# Patient Record
(demographics unavailable — no encounter records)

---

## 2018-02-21 NOTE — RAD
PORTABLE CHEST

2/21/18

 

An AP portable film at 0805 is compared with a 9/19/12 study from [facility].

 

The heart is normal in size. The lungs are clear. No lobar infiltrate or effusion was seen. Some mini
mal left basilar haziness is probably a combination of atelectasis and the patient being turned sligh
tly. 

 

IMPRESSION:  

No acute findings. 

 

POS: HOME

## 2019-11-20 NOTE — OP
DATE OF PROCEDURE:  11/20/2019



PROCEDURE PERFORMED:  Colonoscopy.



PREMEDICATION:  Given by Anesthesiology Department.



PREPROCEDURE DIAGNOSES:  

1. History of colon polyps.

2. Family history of colon cancer in second-degree relative.



POSTPROCEDURE DIAGNOSIS:  Normal colon exam.



DESCRIPTION OF PROCEDURE:  Written consents were obtained prior to procedure.  After

adequate sedation, rectal exam performed was normal.  The endoscope was advanced to

the cecum.  The quality of the bowel prep was good.  The cecum, ascending colon,

hepatic flexure, transverse colon, splenic flexure, descending colon, and

rectosigmoid colon all appeared normal.  Retroflexion did not show any abnormality.

The patient tolerated the procedure well. 



ASSESSMENT:  Normal colonoscopy.



RECOMMENDATION:  Repeat colon screening in 10 years as her previous history of polyp

was hyperplastic. 







Job ID:  [id number]

## 2019-11-20 NOTE — OP
DATE OF PROCEDURE:  11/20/2019



PROCEDURE PERFORMED:  Esophagogastroduodenoscopy.



PREMEDICATION:  Given by Anesthesiology Department.



PREPROCEDURE DIAGNOSES:  

1. Gastroesophageal reflux disease.

2. Symptoms not controlled on medication (ranitidine 150 mg b.i.d.).



POSTPROCEDURE DIAGNOSIS:  Normal upper endoscopy.



DESCRIPTION OF PROCEDURE:  Written consents were obtained prior to procedure.  After

adequate sedation, forward-viewing endoscope was advanced down the stomach under

direct vision to the third portion of duodenum.  The duodenum appeared normal.  The

bulb appeared normal.  The pylorus was patent.  The gastric antrum, body, fundus,

and cardia all appeared normal.  Retroflexion did not show any abnormality or hiatal

hernia.  The GE junction with regular Z-line is located at 36 cm from the incisors.

The lower, mid, and upper esophagus appeared normal.  The instrument was then fully

removed.  The patient tolerated the procedure well. 



ASSESSMENT:  Normal upper endoscopy.



RECOMMENDATION:  

1. Start pantoprazole 40 mg p.o. daily.

2. Anti-reflux regimen.







Job ID:  [id number]